# Patient Record
Sex: MALE | Race: WHITE | NOT HISPANIC OR LATINO | Employment: FULL TIME | URBAN - METROPOLITAN AREA
[De-identification: names, ages, dates, MRNs, and addresses within clinical notes are randomized per-mention and may not be internally consistent; named-entity substitution may affect disease eponyms.]

---

## 2018-01-11 NOTE — RESULT NOTES
Message   Recorded as Task   Date: 05/05/2016 06:54 AM, Created By: Lillie Escamilla   Task Name: Follow Up   Assigned To: Caralee Boxer   Regarding Patient: Ramon Banuelos, Status: In Progress   CommentMatt Grossmandenilson - 05 May 2016 6:54 AM     TASK CREATED  Lab review  Vitamin D level is low at 13  102 start a vitamin D protocol 50,000 units weekly for 3 months  Get a basic metabolic panel and vitamin D level XXV-hydroxy in 3 months  All the other lab work are good   Ruba Almanza - 06 May 2016 3:10 PM     TASK EDITED  I left a message for Rhett Back to call back  Ruba Almanza - 06 May 2016 3:10 PM     [facility] 06 May 2016 3:38 PM     TASK EDITED  Spoke with patient and gave results  Pt states that he wants to think about  He did not want me to send this to the pharmacy    He will call next week and let us know

## 2022-11-28 ENCOUNTER — TELEPHONE (OUTPATIENT)
Dept: INTERNAL MEDICINE CLINIC | Facility: CLINIC | Age: 42
End: 2022-11-28

## 2022-11-28 NOTE — TELEPHONE ENCOUNTER
11/28/22 3:17 PM        The office's request has been received, reviewed, and the patient chart updated  The PCP has successfully been removed with a patient attribution note  This message will now be completed          Thank you  Soraida Hamm